# Patient Record
Sex: FEMALE | Race: WHITE | NOT HISPANIC OR LATINO | Employment: FULL TIME | URBAN - METROPOLITAN AREA
[De-identification: names, ages, dates, MRNs, and addresses within clinical notes are randomized per-mention and may not be internally consistent; named-entity substitution may affect disease eponyms.]

---

## 2020-06-22 ENCOUNTER — TELEPHONE (OUTPATIENT)
Dept: ORTHOPEDICS | Facility: CLINIC | Age: 40
End: 2020-06-22

## 2020-06-22 NOTE — TELEPHONE ENCOUNTER
Spoke w/ patient and informed her that I would send a message to the staff to get her scheduled. Patient verbalized understanding and was grateful for the call-DD

## 2020-06-23 ENCOUNTER — TELEPHONE (OUTPATIENT)
Dept: INTERNAL MEDICINE | Facility: CLINIC | Age: 40
End: 2020-06-23

## 2020-06-23 NOTE — TELEPHONE ENCOUNTER
Patient notified and advised that  is no longer taking new patients and will be going to sports medicine full time, no longer primary care. Offered to schedule appointment with another provider, patient declined. Advised to call the office as needed.

## 2020-06-23 NOTE — TELEPHONE ENCOUNTER
----- Message from Kecia Vang MA sent at 6/22/2020  1:25 PM CDT -----  Regarding: FW: EST CARE /PCP  Contact: PATIENT  Good afternoon,         Patient was referred to Dr. Olmedo by a friend and would like to get in asap due to having tingling and numbness in the face. Please Advise.   ----- Message -----  From: Kelsi Gann  Sent: 6/22/2020  12:19 PM CDT  To: [facility] Staff  Subject: EST CARE /PCP                                    CALLING TO GET IN ASAP TO EST AND HAVING NUMBNESS TINGLING IN FACE & FATIGUE. PLEASE CALL PATIENT @ [phone]. THANKS

## 2022-02-06 ENCOUNTER — OFFICE VISIT (OUTPATIENT)
Dept: URGENT CARE | Facility: CLINIC | Age: 42
End: 2022-02-06
Payer: COMMERCIAL

## 2022-02-06 VITALS
SYSTOLIC BLOOD PRESSURE: 101 MMHG | OXYGEN SATURATION: 96 % | HEART RATE: 70 BPM | RESPIRATION RATE: 18 BRPM | TEMPERATURE: 97 F | DIASTOLIC BLOOD PRESSURE: 66 MMHG

## 2022-02-06 DIAGNOSIS — J06.9 URI WITH COUGH AND CONGESTION: Primary | ICD-10-CM

## 2022-02-06 DIAGNOSIS — J04.0 ACUTE LARYNGITIS: ICD-10-CM

## 2022-02-06 LAB
CTP QC/QA: YES
SARS-COV-2 RDRP RESP QL NAA+PROBE: NEGATIVE

## 2022-02-06 PROCEDURE — 99203 PR OFFICE/OUTPT VISIT, NEW, LEVL III, 30-44 MIN: ICD-10-PCS | Mod: 25,S$GLB,, | Performed by: PHYSICIAN ASSISTANT

## 2022-02-06 PROCEDURE — 99203 OFFICE O/P NEW LOW 30 MIN: CPT | Mod: 25,S$GLB,, | Performed by: PHYSICIAN ASSISTANT

## 2022-02-06 PROCEDURE — U0002: ICD-10-PCS | Mod: QW,S$GLB,, | Performed by: PHYSICIAN ASSISTANT

## 2022-02-06 PROCEDURE — 1160F RVW MEDS BY RX/DR IN RCRD: CPT | Mod: CPTII,S$GLB,, | Performed by: PHYSICIAN ASSISTANT

## 2022-02-06 PROCEDURE — 96372 PR INJECTION,THERAP/PROPH/DIAG2ST, IM OR SUBCUT: ICD-10-PCS | Mod: S$GLB,,, | Performed by: EMERGENCY MEDICINE

## 2022-02-06 PROCEDURE — U0002 COVID-19 LAB TEST NON-CDC: HCPCS | Mod: QW,S$GLB,, | Performed by: PHYSICIAN ASSISTANT

## 2022-02-06 PROCEDURE — 3074F PR MOST RECENT SYSTOLIC BLOOD PRESSURE < 130 MM HG: ICD-10-PCS | Mod: CPTII,S$GLB,, | Performed by: PHYSICIAN ASSISTANT

## 2022-02-06 PROCEDURE — 3078F PR MOST RECENT DIASTOLIC BLOOD PRESSURE < 80 MM HG: ICD-10-PCS | Mod: CPTII,S$GLB,, | Performed by: PHYSICIAN ASSISTANT

## 2022-02-06 PROCEDURE — 96372 THER/PROPH/DIAG INJ SC/IM: CPT | Mod: S$GLB,,, | Performed by: EMERGENCY MEDICINE

## 2022-02-06 PROCEDURE — 3074F SYST BP LT 130 MM HG: CPT | Mod: CPTII,S$GLB,, | Performed by: PHYSICIAN ASSISTANT

## 2022-02-06 PROCEDURE — 1160F PR REVIEW ALL MEDS BY PRESCRIBER/CLIN PHARMACIST DOCUMENTED: ICD-10-PCS | Mod: CPTII,S$GLB,, | Performed by: PHYSICIAN ASSISTANT

## 2022-02-06 PROCEDURE — 1159F PR MEDICATION LIST DOCUMENTED IN MEDICAL RECORD: ICD-10-PCS | Mod: CPTII,S$GLB,, | Performed by: PHYSICIAN ASSISTANT

## 2022-02-06 PROCEDURE — 3078F DIAST BP <80 MM HG: CPT | Mod: CPTII,S$GLB,, | Performed by: PHYSICIAN ASSISTANT

## 2022-02-06 PROCEDURE — 1159F MED LIST DOCD IN RCRD: CPT | Mod: CPTII,S$GLB,, | Performed by: PHYSICIAN ASSISTANT

## 2022-02-06 RX ORDER — FLUTICASONE PROPIONATE 50 MCG
2 SPRAY, SUSPENSION (ML) NASAL DAILY
Qty: 9.9 ML | Refills: 0 | Status: SHIPPED | OUTPATIENT
Start: 2022-02-06 | End: 2022-02-13

## 2022-02-06 RX ORDER — DEXAMETHASONE SODIUM PHOSPHATE 100 MG/10ML
8 INJECTION INTRAMUSCULAR; INTRAVENOUS
Status: COMPLETED | OUTPATIENT
Start: 2022-02-06 | End: 2022-02-06

## 2022-02-06 RX ORDER — PROMETHAZINE HYDROCHLORIDE AND DEXTROMETHORPHAN HYDROBROMIDE 6.25; 15 MG/5ML; MG/5ML
5 SYRUP ORAL EVERY 6 HOURS PRN
Qty: 140 ML | Refills: 0 | Status: SHIPPED | OUTPATIENT
Start: 2022-02-06 | End: 2022-02-13

## 2022-02-06 RX ADMIN — DEXAMETHASONE SODIUM PHOSPHATE 8 MG: 100 INJECTION INTRAMUSCULAR; INTRAVENOUS at 09:02

## 2022-02-06 NOTE — PROGRESS NOTES
Subjective:       Patient ID: Shyanne Richardson is a 41 y.o. female.    Vitals:  tympanic temperature is 97.3 °F (36.3 °C). Her blood pressure is 101/66 and her pulse is 70. Her respiration is 18 and oxygen saturation is 96%.     Chief Complaint: Sore Throat    Hoarse voice, HA, body aches, nasal and chest congestion and cough x 3 days. Took at home covid test which was negative at onset. No fever, CP or SOB     Sore Throat   This is a new problem. The current episode started in the past 7 days (02/03/22). The problem has been gradually worsening. Neither side of throat is experiencing more pain than the other. There has been no fever. The pain is at a severity of 10/10. The pain is mild. Associated symptoms include congestion, coughing, headaches, a hoarse voice and swollen glands. Pertinent negatives include no abdominal pain, diarrhea, drooling, ear discharge, ear pain, plugged ear sensation, neck pain, shortness of breath, stridor, trouble swallowing or vomiting. Associated symptoms comments: Nausea, dizziness, body aches. She has had no exposure to strep or mono. Treatments tried: mucinex, aleve. The treatment provided mild relief.       Constitution: Positive for fatigue. Negative for chills, sweating and fever.   HENT: Positive for congestion, sinus pressure and sore throat. Negative for ear pain, ear discharge, drooling and trouble swallowing.    Neck: Negative for neck pain and neck stiffness.   Cardiovascular: Negative for chest pain, leg swelling, palpitations and sob on exertion.   Eyes: Negative for eye discharge, eye itching, eye pain and eye redness.   Respiratory: Positive for cough and sputum production. Negative for shortness of breath and stridor.    Gastrointestinal: Negative for abdominal pain, vomiting and diarrhea.   Genitourinary: Negative for dysuria.   Musculoskeletal: Positive for muscle ache.   Skin: Negative for rash and wound.   Neurological: Positive for headaches.       Objective:       Physical Exam   Constitutional: She is oriented to person, place, and time. She appears well-developed and well-nourished. She is cooperative.  Non-toxic appearance. She does not have a sickly appearance. She does not appear ill. No distress.   HENT:   Head: Normocephalic and atraumatic.   Ears:   Right Ear: Hearing, tympanic membrane, external ear and ear canal normal.   Left Ear: Hearing, tympanic membrane, external ear and ear canal normal.   Nose: Mucosal edema and congestion present. No rhinorrhea or nasal deformity. No epistaxis. Right sinus exhibits no maxillary sinus tenderness and no frontal sinus tenderness. Left sinus exhibits no maxillary sinus tenderness and no frontal sinus tenderness.   Mouth/Throat: Uvula is midline and mucous membranes are normal. No trismus in the jaw. Normal dentition. No uvula swelling. Posterior oropharyngeal erythema and cobblestoning present. No oropharyngeal exudate or posterior oropharyngeal edema.   Eyes: Conjunctivae and lids are normal. No scleral icterus.   Neck: Trachea normal and phonation normal. Neck supple. No edema present. No erythema present. No neck rigidity present.   Cardiovascular: Normal rate, regular rhythm, normal heart sounds, intact distal pulses and normal pulses.   Pulmonary/Chest: Effort normal and breath sounds normal. No respiratory distress. She has no decreased breath sounds. She has no wheezes. She has no rhonchi. She has no rales.   Abdominal: Normal appearance. There is no abdominal tenderness. There is no rebound and no guarding.   Musculoskeletal: Normal range of motion.         General: No deformity or edema. Normal range of motion.   Lymphadenopathy:     She has no cervical adenopathy.   Neurological: She is alert and oriented to person, place, and time. She exhibits normal muscle tone. Coordination normal.   Skin: Skin is warm, dry, intact, not diaphoretic and not pale.   Psychiatric: She has a normal mood and affect. Her speech is  normal and behavior is normal. Judgment and thought content normal. Cognition and memory  Nursing note and vitals reviewed.        Results for orders placed or performed in visit on 02/06/22   POCT COVID-19 Rapid Screening   Result Value Ref Range    POC Rapid COVID Negative Negative     Acceptable Yes        Assessment:       1. URI with cough and congestion    2. Acute laryngitis          Plan:       Pt requested IM steroid. Risks and side effects discussed, as well as option to take po steroids with similar efficacy. Pt elects to receive.     URI with cough and congestion  -     POCT COVID-19 Rapid Screening    Acute laryngitis  -     POCT COVID-19 Rapid Screening    Other orders  -     fluticasone propionate (FLONASE) 50 mcg/actuation nasal spray; 2 sprays (100 mcg total) by Each Nostril route once daily. Dispense 1 bottle for 7 days  Dispense: 9.9 mL; Refill: 0  -     promethazine-dextromethorphan (PROMETHAZINE-DM) 6.25-15 mg/5 mL Syrp; Take 5 mLs by mouth every 6 (six) hours as needed (cough).  Dispense: 140 mL; Refill: 0  -     dexamethasone injection 8 mg    Rebeca Gr PA-C  [facility]         Patient Instructions     Take mucinex DM for daytime use for cough and congestion. You may try the prescribed promethazine DM at night (may cause drowsiness). Alternatively, if your cough becomes more severe you may take promethazine DM cough syrup every 4-6 hours (but do not combine with over the counter cough syrup). Rest and drink plenty of fluids.Tylenol or motrin for fever, sore throat or body aches. Nasal sterile saline sprays and Flonase 1-2 sprays/nostril daily will help with nasal congestion.     You need re-evaluated for any new persistent fever > 4 days, progressive chest pain or shortness of breath, sinus congestion/pressure > 10 days, or cough lasting > 4 weeks.    You were given a steroid injection today. Risks and benefits were discussed with you.   If you have  diabetes this injection will raise your blood sugar. This will normalize over the next 2-3 days. Please make sure you monitor you blood sugar accordingly.   This medication can also increase you blood pressure. Please monitor your blood pressure as discussed.   Please keep in mind that you should not take long term steroids unless prescribed by your physician. You should not exceed 4 steroid injections in a year and if you have multiple injections they should be spaced out adequately. Long term side effects and risks can occur as mentioned at todays visit.     Patient Education       Laryngitis   The Basics   Written by the doctors and editors at [facility]   What is laryngitis? -- Laryngitis is the medical term for when your vocal cords are inflamed. Laryngitis usually causes your voice to sound hoarse. It can even make you lose your voice completely.  What causes laryngitis? -- Laryngitis can be caused by:  · The common cold and other infections that affect the throat  · Shouting or straining your voice too much  · Breathing in harsh chemicals, such as  or gasoline  · Drinking too much alcohol or smoking   · Acid reflux, which is when the acid from your stomach backs up into your throat  There are also medical problems besides laryngitis that can make your voice hoarse or make you lose your voice. For example, people can have these symptoms because of:  · Abnormal growths on the vocal cords  · Muscle disorders affecting the voice box  · Cancer of the throat  Is there anything I can do on my own to get rid of laryngitis? -- Yes. There are different things you can do, depending on what caused your laryngitis.  · If your laryngitis happened because you strained your voice too much, give your voice a rest. If you are a morrison or need to use your voice for work, you might want to think about taking voice lessons. An experienced teacher can help you learn how to protect your voice and prevent straining it  again.  · If your laryngitis was caused by drinking alcohol, limit how much you drink. If it was related to smoking, the best thing you can do is to quit smoking. If you are having trouble quitting, your doctor or nurse can help.  · If your laryngitis was caused by breathing in a harsh chemical, avoid the chemical. If that is not possible, at least make sure there is a lot fresh air coming in when you are dealing with fumes. If you work near chemical fumes that are making you hoarse, speak with your employer about getting masks or ventilation fans.  · If your laryngitis was caused by acid reflux, take steps to avoid acid reflux. For example:  ? Take medicines for acid reflux, if your doctor recommends them.  ? Avoid foods that make your symptoms worse. (Common examples include alcohol, coffee, and chocolate.)  ? If you smoke, stop smoking.  ? Eat many small meals each day, rather than 2 or 3 big meals.  ? Do not lie down for at least 3 hours after eating, particularly after a big meal.  Should I see a doctor or nurse? -- That depends on how long your symptoms last and whether you have symptoms besides hoarseness.  Most people with laryngitis get better on their own within 2 to 3 weeks. If your voice is hoarse or gone for 2 weeks or longer, and you do not seem to be getting better, see a doctor or nurse.  You should also see a doctor or nurse if you also have a sore throat and:  · You have a fever of at least 101°F or 38.4°C  · Your throat pain is severe or does not start to improve within 5 to 7 days  Will I need tests? -- Maybe. If your doctor or nurse is not sure what is causing your symptoms, you might need tests. For example, you might have a laryngoscopy, which is when the doctor puts a tube with a tiny camera in your throat to look at your voice box.  How is laryngitis treated? -- That depends on what is causing it. If your laryngitis is caused by a cold or other minor infection, you will probably not need any  treatment. If something else is causing your laryngitis, you might need treatment, depending on the situation.  What if my child gets laryngitis? -- Some of the same things that cause laryngitis in adults can cause it children, too. For instance, children can get laryngitis because of a throat infection or common cold, because of acid reflux, or because they strain their voice too much. But in children, sounding hoarse can have lots of other causes. For example, children sometimes develop bumps on their vocal cords or have birth defects affecting their voice box.  See a doctor or nurse right away if your child has trouble breathing or has pain or other symptoms that seem to be quickly getting worse. You should also see a doctor or nurse if your child has laryngitis for more than 2 weeks, or if the laryngitis is getting worse or is making it hard for your child to interact with others. If your child has laryngitis or throat discomfort or pain that gets better and comes back, see a doctor or nurse. If your child is a baby, call the baby's doctor as soon as you notice symptoms. The doctor will tell you what to do for your baby.  All topics are updated as new evidence becomes available and our peer review process is complete.  This topic retrieved from [employer] on: Sep 21, 2021.  Topic 96179 Version 14.0  Release: 29.4.2 - C29.263  © 2021 UpToDate, Inc. and/or its affiliates. All rights reserved.  Consumer Information Use and Disclaimer   This information is not specific medical advice and does not replace information you receive from your health care provider. This is only a brief summary of general information. It does NOT include all information about conditions, illnesses, injuries, tests, procedures, treatments, therapies, discharge instructions or life-style choices that may apply to you. You must talk with your health care provider for complete information about your health and treatment options. This information  should not be used to decide whether or not to accept your health care provider's advice, instructions or recommendations. Only your health care provider has the knowledge and training to provide advice that is right for you. The use of this information is governed by the [employer] End User License Agreement, available at [email or web address]://[email or web address]/en/solutions/[employer]/about/kira.The use of [employer] content is governed by the [employer] Terms of Use. ©2021 UpToDate, Inc. All rights reserved.  Copyright   © 2021 UpToDate, Inc. and/or its affiliates. All rights reserved.  Patient Education       Cough, Runny Nose, and the Common Cold   The Basics   Written by the doctors and editors at "[employer]   What causes cough, runny nose, and other symptoms of the common cold? -- These symptoms are usually caused by a viral infection. Lots of different viruses can take hold inside your nose, mouth, throat, or airways and cause cold symptoms.  Most people get over a cold without any lasting problems. Even so, having a cold can be uncomfortable. Also, some cold symptoms can also be caused by other illnesses, such as coronavirus 2019 (COVID-19) or the flu.  What are the symptoms of the common cold? -- The symptoms include:  · Sneezing  · Coughing  · Sniffling and runny nose  · Sore throat  · Chest congestion  In children, the common cold can also cause a fever. But adults do not usually get a fever when they have a cold. Some symptoms of the common cold can overlap with symptoms of COVID-19, although sneezing is uncommon in COVID-19.   When should I call the doctor or nurse? -- Contact your doctor or nurse if you live in an area where people have COVID-19. They will ask you questions about your symptoms and whether you might be at risk. They can tell you if you should get tested for the virus that causes COVID-19. If they think you are more likely to just have a cold, they might tell you to stay home and contact them again if your  symptoms change or get worse.   You should also contact your doctor or nurse if you:  · Lose your sense of taste or smell  · Have a fever of more than 100.4º F (38º C) that comes with shaking chills, loss of appetite, or trouble breathing  · Have a fever and also have lung disease, such as emphysema or asthma  · Have a cough that lasts longer than 10 days  · Have chest pain when you cough or breathe deeply, have trouble breathing, or cough up blood  If you are older than 65, or if you have any chronic medical conditions such as diabetes, you should contact your doctor or nurse any time you get a long-lasting cough.  Take your child to the emergency room if they:  · Become confused or stop responding to you  · Have trouble breathing or have to work hard to breathe  Contact your child's doctor or nurse if the child:  · Refuses to drink anything for a long time  · Is younger than 4 months  · Has a fever and is not acting like themself  · Has a cough that lasts for more than 2 weeks and is not getting any better  · Has a stuffed or runny nose that gets worse or does not get any better after 10 days  · Has red eyes or yellow goop coming out of their eyes  · Has ear pain, pulls at their ears, or shows other signs of having an ear infection  What can I do to feel better? -- If you are a teenager or an adult, you can try cough and cold medicines that you can get without a prescription. These medicines might help with your symptoms. But they won't cure your cold, or help you get well faster.  If you decide to try nonprescription cold medicines, be sure to follow the directions on the label. Do not combine 2 or more medicines that have acetaminophen in them. If you take too much acetaminophen, the drug can damage your liver. Also, if you have a heart condition, high blood pressure, or you take any prescription medicines, ask your pharmacist if it is safe to take the cold medicine you have in mind.  What should I know if my  child has a cold? -- In children, the common cold is often more severe than it is in adults. It also lasts longer. Plus, children often get a fever during the first 3 days of a cold.  Are cough and cold medicines safe for children? -- If your child is younger than 6, you should not give them any cold medicines. These medicines are not safe for young children. Even if your child is older than 6, cough and cold medicines are unlikely to help.  Never give aspirin to any child younger than 18 years old. In children, aspirin can cause a life-threatening condition called Reye syndrome. When giving your child acetaminophen or other nonprescription medicines, never give more than the recommended dose.  How long will I be sick? -- Colds usually last 3 to 7 days in adults and 10 days in children, but some people have symptoms for up to 2 weeks.  Can the common cold lead to more serious problems? -- In some cases, yes. In some people having a cold can lead to:  · Ear infections  · Worsening of asthma symptoms  · Sinus infections  · Pneumonia or bronchitis (infections of the lungs)  How can I keep from getting another cold? -- The most important thing you can do is to wash your hands often with soap and water. This can also prevent the spread of other illnesses like the flu and COVID-19. The table has instructions on how to wash your hands to prevent spreading illness (table 1).  The germs that cause the common cold can live on tables, door handles, and other surfaces for at least 2 hours. You never know when you might be touching germs. That's why it's so important to clean your hands often.  It's also important to stay away from other people when you are sick. This will help prevent the spread of illness.  All topics are updated as new evidence becomes available and our peer review process is complete.  This topic retrieved from [employer] on: Sep 21, 2021.  Topic 96823 Version 20.0  Release: 29.4.2 - C29.263  © 2021 [employer]  Inc. and/or its affiliates. All rights reserved.  table 1: Hand washing to prevent spreading illness  · Wet your hands and put soap on them    · Rub your hands together for at least 20 seconds. Make sure to clean your wrists, fingernails, and in between your fingers.    · Rinse your hands    · Dry your hands with a paper towel that you can throw away    If you are not near a sink, you can use a hand gel to clean your hands. The gels with at least 60 percent alcohol work the best. But it is better to wash with soap and water if you can.  Graphic 080621 Version 3.0  Consumer Information Use and Disclaimer   This information is not specific medical advice and does not replace information you receive from your health care provider. This is only a brief summary of general information. It does NOT include all information about conditions, illnesses, injuries, tests, procedures, treatments, therapies, discharge instructions or life-style choices that may apply to you. You must talk with your health care provider for complete information about your health and treatment options. This information should not be used to decide whether or not to accept your health care provider's advice, instructions or recommendations. Only your health care provider has the knowledge and training to provide advice that is right for you. The use of this information is governed by the [employer] End User License Agreement, available at https://www.[employer]Tapad/en/solutions/[employer]/about/kira.The use of [employer] content is governed by the [employer] Terms of Use. ©2021 UpToDate, Inc. All rights reserved.  Copyright   © 2021 UpToDate, Inc. and/or its affiliates. All rights reserved.

## 2022-02-06 NOTE — PATIENT INSTRUCTIONS
Take mucinex DM for daytime use for cough and congestion. You may try the prescribed promethazine DM at night (may cause drowsiness). Alternatively, if your cough becomes more severe you may take promethazine DM cough syrup every 4-6 hours (but do not combine with over the counter cough syrup). Rest and drink plenty of fluids.Tylenol or motrin for fever, sore throat or body aches. Nasal sterile saline sprays and Flonase 1-2 sprays/nostril daily will help with nasal congestion.     You need re-evaluated for any new persistent fever > 4 days, progressive chest pain or shortness of breath, sinus congestion/pressure > 10 days, or cough lasting > 4 weeks.    You were given a steroid injection today. Risks and benefits were discussed with you.   If you have diabetes this injection will raise your blood sugar. This will normalize over the next 2-3 days. Please make sure you monitor you blood sugar accordingly.   This medication can also increase you blood pressure. Please monitor your blood pressure as discussed.   Please keep in mind that you should not take long term steroids unless prescribed by your physician. You should not exceed 4 steroid injections in a year and if you have multiple injections they should be spaced out adequately. Long term side effects and risks can occur as mentioned at todays visit.     Patient Education       Laryngitis   The Basics   Written by the doctors and editors at [facility]   What is laryngitis? -- Laryngitis is the medical term for when your vocal cords are inflamed. Laryngitis usually causes your voice to sound hoarse. It can even make you lose your voice completely.  What causes laryngitis? -- Laryngitis can be caused by:  · The common cold and other infections that affect the throat  · Shouting or straining your voice too much  · Breathing in harsh chemicals, such as  or gasoline  · Drinking too much alcohol or smoking   · Acid reflux, which is when the acid from your stomach  backs up into your throat  There are also medical problems besides laryngitis that can make your voice hoarse or make you lose your voice. For example, people can have these symptoms because of:  · Abnormal growths on the vocal cords  · Muscle disorders affecting the voice box  · Cancer of the throat  Is there anything I can do on my own to get rid of laryngitis? -- Yes. There are different things you can do, depending on what caused your laryngitis.  · If your laryngitis happened because you strained your voice too much, give your voice a rest. If you are a morrison or need to use your voice for work, you might want to think about taking voice lessons. An experienced teacher can help you learn how to protect your voice and prevent straining it again.  · If your laryngitis was caused by drinking alcohol, limit how much you drink. If it was related to smoking, the best thing you can do is to quit smoking. If you are having trouble quitting, your doctor or nurse can help.  · If your laryngitis was caused by breathing in a harsh chemical, avoid the chemical. If that is not possible, at least make sure there is a lot fresh air coming in when you are dealing with fumes. If you work near chemical fumes that are making you hoarse, speak with your employer about getting masks or ventilation fans.  · If your laryngitis was caused by acid reflux, take steps to avoid acid reflux. For example:  ? Take medicines for acid reflux, if your doctor recommends them.  ? Avoid foods that make your symptoms worse. (Common examples include alcohol, coffee, and chocolate.)  ? If you smoke, stop smoking.  ? Eat many small meals each day, rather than 2 or 3 big meals.  ? Do not lie down for at least 3 hours after eating, particularly after a big meal.  Should I see a doctor or nurse? -- That depends on how long your symptoms last and whether you have symptoms besides hoarseness.  Most people with laryngitis get better on their own within 2 to  3 weeks. If your voice is hoarse or gone for 2 weeks or longer, and you do not seem to be getting better, see a doctor or nurse.  You should also see a doctor or nurse if you also have a sore throat and:  · You have a fever of at least 101°F or 38.4°C  · Your throat pain is severe or does not start to improve within 5 to 7 days  Will I need tests? -- Maybe. If your doctor or nurse is not sure what is causing your symptoms, you might need tests. For example, you might have a laryngoscopy, which is when the doctor puts a tube with a tiny camera in your throat to look at your voice box.  How is laryngitis treated? -- That depends on what is causing it. If your laryngitis is caused by a cold or other minor infection, you will probably not need any treatment. If something else is causing your laryngitis, you might need treatment, depending on the situation.  What if my child gets laryngitis? -- Some of the same things that cause laryngitis in adults can cause it children, too. For instance, children can get laryngitis because of a throat infection or common cold, because of acid reflux, or because they strain their voice too much. But in children, sounding hoarse can have lots of other causes. For example, children sometimes develop bumps on their vocal cords or have birth defects affecting their voice box.  See a doctor or nurse right away if your child has trouble breathing or has pain or other symptoms that seem to be quickly getting worse. You should also see a doctor or nurse if your child has laryngitis for more than 2 weeks, or if the laryngitis is getting worse or is making it hard for your child to interact with others. If your child has laryngitis or throat discomfort or pain that gets better and comes back, see a doctor or nurse. If your child is a baby, call the baby's doctor as soon as you notice symptoms. The doctor will tell you what to do for your baby.  All topics are updated as new evidence becomes  available and our peer review process is complete.  This topic retrieved from [employer] on: Sep 21, 2021.  Topic 31052 Version 14.0  Release: 29.4.2 - C29.263  © 2021 UpToDate, Inc. and/or its affiliates. All rights reserved.  Consumer Information Use and Disclaimer   This information is not specific medical advice and does not replace information you receive from your health care provider. This is only a brief summary of general information. It does NOT include all information about conditions, illnesses, injuries, tests, procedures, treatments, therapies, discharge instructions or life-style choices that may apply to you. You must talk with your health care provider for complete information about your health and treatment options. This information should not be used to decide whether or not to accept your health care provider's advice, instructions or recommendations. Only your health care provider has the knowledge and training to provide advice that is right for you. The use of this information is governed by the [employer] End User License Agreement, available at https://www.Zaz[employer]/en/solutions/[employer]/about/kira.The use of [employer] content is governed by the [employer] Terms of Use. ©2021 UpToDate, Inc. All rights reserved.  Copyright   © 2021 UpToDate, Inc. and/or its affiliates. All rights reserved.  Patient Education       Cough, Runny Nose, and the Common Cold   The Basics   Written by the doctors and editors at [employer]   What causes cough, runny nose, and other symptoms of the common cold? -- These symptoms are usually caused by a viral infection. Lots of different viruses can take hold inside your nose, mouth, throat, or airways and cause cold symptoms.  Most people get over a cold without any lasting problems. Even so, having a cold can be uncomfortable. Also, some cold symptoms can also be caused by other illnesses, such as coronavirus 2019 (COVID-19) or the flu.  What are the symptoms of the  common cold? -- The symptoms include:  · Sneezing  · Coughing  · Sniffling and runny nose  · Sore throat  · Chest congestion  In children, the common cold can also cause a fever. But adults do not usually get a fever when they have a cold. Some symptoms of the common cold can overlap with symptoms of COVID-19, although sneezing is uncommon in COVID-19.   When should I call the doctor or nurse? -- Contact your doctor or nurse if you live in an area where people have COVID-19. They will ask you questions about your symptoms and whether you might be at risk. They can tell you if you should get tested for the virus that causes COVID-19. If they think you are more likely to just have a cold, they might tell you to stay home and contact them again if your symptoms change or get worse.   You should also contact your doctor or nurse if you:  · Lose your sense of taste or smell  · Have a fever of more than 100.4º F (38º C) that comes with shaking chills, loss of appetite, or trouble breathing  · Have a fever and also have lung disease, such as emphysema or asthma  · Have a cough that lasts longer than 10 days  · Have chest pain when you cough or breathe deeply, have trouble breathing, or cough up blood  If you are older than 65, or if you have any chronic medical conditions such as diabetes, you should contact your doctor or nurse any time you get a long-lasting cough.  Take your child to the emergency room if they:  · Become confused or stop responding to you  · Have trouble breathing or have to work hard to breathe  Contact your child's doctor or nurse if the child:  · Refuses to drink anything for a long time  · Is younger than 4 months  · Has a fever and is not acting like themself  · Has a cough that lasts for more than 2 weeks and is not getting any better  · Has a stuffed or runny nose that gets worse or does not get any better after 10 days  · Has red eyes or yellow goop coming out of their eyes  · Has ear pain,  pulls at their ears, or shows other signs of having an ear infection  What can I do to feel better? -- If you are a teenager or an adult, you can try cough and cold medicines that you can get without a prescription. These medicines might help with your symptoms. But they won't cure your cold, or help you get well faster.  If you decide to try nonprescription cold medicines, be sure to follow the directions on the label. Do not combine 2 or more medicines that have acetaminophen in them. If you take too much acetaminophen, the drug can damage your liver. Also, if you have a heart condition, high blood pressure, or you take any prescription medicines, ask your pharmacist if it is safe to take the cold medicine you have in mind.  What should I know if my child has a cold? -- In children, the common cold is often more severe than it is in adults. It also lasts longer. Plus, children often get a fever during the first 3 days of a cold.  Are cough and cold medicines safe for children? -- If your child is younger than 6, you should not give them any cold medicines. These medicines are not safe for young children. Even if your child is older than 6, cough and cold medicines are unlikely to help.  Never give aspirin to any child younger than 18 years old. In children, aspirin can cause a life-threatening condition called Reye syndrome. When giving your child acetaminophen or other nonprescription medicines, never give more than the recommended dose.  How long will I be sick? -- Colds usually last 3 to 7 days in adults and 10 days in children, but some people have symptoms for up to 2 weeks.  Can the common cold lead to more serious problems? -- In some cases, yes. In some people having a cold can lead to:  · Ear infections  · Worsening of asthma symptoms  · Sinus infections  · Pneumonia or bronchitis (infections of the lungs)  How can I keep from getting another cold? -- The most important thing you can do is to wash your  hands often with soap and water. This can also prevent the spread of other illnesses like the flu and COVID-19. The table has instructions on how to wash your hands to prevent spreading illness (table 1).  The germs that cause the common cold can live on tables, door handles, and other surfaces for at least 2 hours. You never know when you might be touching germs. That's why it's so important to clean your hands often.  It's also important to stay away from other people when you are sick. This will help prevent the spread of illness.  All topics are updated as new evidence becomes available and our peer review process is complete.  This topic retrieved from [employer] on: Sep 21, 2021.  Topic 49978 Version 20.0  Release: 29.4.2 - C29.263  © 2021 UpToDate, Inc. and/or its affiliates. All rights reserved.  table 1: Hand washing to prevent spreading illness  · Wet your hands and put soap on them    · Rub your hands together for at least 20 seconds. Make sure to clean your wrists, fingernails, and in between your fingers.    · Rinse your hands    · Dry your hands with a paper towel that you can throw away    If you are not near a sink, you can use a hand gel to clean your hands. The gels with at least 60 percent alcohol work the best. But it is better to wash with soap and water if you can.  Graphic 989426 Version 3.0  Consumer Information Use and Disclaimer   This information is not specific medical advice and does not replace information you receive from your health care provider. This is only a brief summary of general information. It does NOT include all information about conditions, illnesses, injuries, tests, procedures, treatments, therapies, discharge instructions or life-style choices that may apply to you. You must talk with your health care provider for complete information about your health and treatment options. This information should not be used to decide whether or not to accept your health care provider's  advice, instructions or recommendations. Only your health care provider has the knowledge and training to provide advice that is right for you. The use of this information is governed by the [employer] End User License Agreement, available at https://www.[employer]/en/solutions/[employer]/about/kira.The use of [employer] content is governed by the [employer] Terms of Use. ©2021 UpToDate, Inc. All rights reserved.  Copyright   © 2021 UpToDate, Inc. and/or its affiliates. All rights reserved.

## 2022-02-09 ENCOUNTER — OFFICE VISIT (OUTPATIENT)
Dept: URGENT CARE | Facility: CLINIC | Age: 42
End: 2022-02-09
Payer: COMMERCIAL

## 2022-02-09 VITALS
DIASTOLIC BLOOD PRESSURE: 67 MMHG | RESPIRATION RATE: 18 BRPM | TEMPERATURE: 100 F | SYSTOLIC BLOOD PRESSURE: 106 MMHG | OXYGEN SATURATION: 100 % | HEIGHT: 61 IN | WEIGHT: 145 LBS | HEART RATE: 88 BPM | BODY MASS INDEX: 27.38 KG/M2

## 2022-02-09 DIAGNOSIS — R50.9 FEVER, UNSPECIFIED FEVER CAUSE: ICD-10-CM

## 2022-02-09 DIAGNOSIS — J01.00 ACUTE NON-RECURRENT MAXILLARY SINUSITIS: Primary | ICD-10-CM

## 2022-02-09 LAB
CTP QC/QA: YES
POC MOLECULAR INFLUENZA A AGN: NEGATIVE
POC MOLECULAR INFLUENZA B AGN: NEGATIVE

## 2022-02-09 PROCEDURE — 87502 POCT INFLUENZA A/B MOLECULAR: ICD-10-PCS | Mod: QW,S$GLB,, | Performed by: PHYSICIAN ASSISTANT

## 2022-02-09 PROCEDURE — 99213 PR OFFICE/OUTPT VISIT, EST, LEVL III, 20-29 MIN: ICD-10-PCS | Mod: S$GLB,,, | Performed by: PHYSICIAN ASSISTANT

## 2022-02-09 PROCEDURE — 3008F PR BODY MASS INDEX (BMI) DOCUMENTED: ICD-10-PCS | Mod: CPTII,S$GLB,, | Performed by: PHYSICIAN ASSISTANT

## 2022-02-09 PROCEDURE — 3008F BODY MASS INDEX DOCD: CPT | Mod: CPTII,S$GLB,, | Performed by: PHYSICIAN ASSISTANT

## 2022-02-09 PROCEDURE — 1160F RVW MEDS BY RX/DR IN RCRD: CPT | Mod: CPTII,S$GLB,, | Performed by: PHYSICIAN ASSISTANT

## 2022-02-09 PROCEDURE — 87502 INFLUENZA DNA AMP PROBE: CPT | Mod: QW,S$GLB,, | Performed by: PHYSICIAN ASSISTANT

## 2022-02-09 PROCEDURE — 3078F DIAST BP <80 MM HG: CPT | Mod: CPTII,S$GLB,, | Performed by: PHYSICIAN ASSISTANT

## 2022-02-09 PROCEDURE — 3074F PR MOST RECENT SYSTOLIC BLOOD PRESSURE < 130 MM HG: ICD-10-PCS | Mod: CPTII,S$GLB,, | Performed by: PHYSICIAN ASSISTANT

## 2022-02-09 PROCEDURE — 1159F MED LIST DOCD IN RCRD: CPT | Mod: CPTII,S$GLB,, | Performed by: PHYSICIAN ASSISTANT

## 2022-02-09 PROCEDURE — 1159F PR MEDICATION LIST DOCUMENTED IN MEDICAL RECORD: ICD-10-PCS | Mod: CPTII,S$GLB,, | Performed by: PHYSICIAN ASSISTANT

## 2022-02-09 PROCEDURE — 99213 OFFICE O/P EST LOW 20 MIN: CPT | Mod: S$GLB,,, | Performed by: PHYSICIAN ASSISTANT

## 2022-02-09 PROCEDURE — 3078F PR MOST RECENT DIASTOLIC BLOOD PRESSURE < 80 MM HG: ICD-10-PCS | Mod: CPTII,S$GLB,, | Performed by: PHYSICIAN ASSISTANT

## 2022-02-09 PROCEDURE — 1160F PR REVIEW ALL MEDS BY PRESCRIBER/CLIN PHARMACIST DOCUMENTED: ICD-10-PCS | Mod: CPTII,S$GLB,, | Performed by: PHYSICIAN ASSISTANT

## 2022-02-09 PROCEDURE — 3074F SYST BP LT 130 MM HG: CPT | Mod: CPTII,S$GLB,, | Performed by: PHYSICIAN ASSISTANT

## 2022-02-09 RX ORDER — DOXYCYCLINE HYCLATE 100 MG
100 TABLET ORAL 2 TIMES DAILY
Qty: 14 TABLET | Refills: 0 | Status: SHIPPED | OUTPATIENT
Start: 2022-02-09 | End: 2022-02-16

## 2022-02-09 NOTE — PROGRESS NOTES
"Subjective:       Patient ID: Shyanne Richardson is a 41 y.o. female.    Vitals:  height is 5' 1" (1.549 m) and weight is 65.8 kg (145 lb). Her temperature is 99.9 °F (37.7 °C). Her blood pressure is 106/67 and her pulse is 88. Her respiration is 18 and oxygen saturation is 100%.     Chief Complaint: Cough (Sinus pressure, nausea, Congestion and sore throat/ ) and Sinus Problem (PT PRESENTS TO U/C WITH S/T, EAR PAIN,CHEST CONGESTION,H/A SINCE Friday.)    PT PRESENTS TO U/C WITH S/T, EAR PAIN,CHEST CONGESTION,H/A SINCE Friday.    Cough  This is a new problem. The current episode started in the past 7 days. The problem has been unchanged. The problem occurs constantly. The cough is productive of sputum. Associated symptoms include ear congestion, nasal congestion and postnasal drip. Nothing aggravates the symptoms. She has tried OTC cough suppressant for the symptoms. The treatment provided no relief.       HENT: Positive for postnasal drip.        Objective:      Physical Exam   HENT:   Head: Normocephalic and atraumatic.   Ears:   Right Ear: impacted cerumen  Left Ear: Tympanic membrane and ear canal normal.   Nose: Rhinorrhea and congestion present. Right sinus exhibits maxillary sinus tenderness. Left sinus exhibits maxillary sinus tenderness.   Mouth/Throat: Uvula is midline. Posterior oropharyngeal erythema present. Tonsils are 0 on the right. Tonsils are 0 on the left. No tonsillar exudate.   Eyes: Conjunctivae are normal. Pupils are equal, round, and reactive to light.   Neck: No neck rigidity present.   Cardiovascular: Normal rate and normal pulses.   Pulmonary/Chest: Effort normal. No stridor. No respiratory distress. She has no wheezes. She has no rhonchi.   Abdominal: Normal appearance and bowel sounds are normal.   Lymphadenopathy:     She has cervical adenopathy.   Neurological: She is alert.   Nursing note and vitals reviewed.        Assessment:       1. Acute non-recurrent maxillary sinusitis    2. " Fever, unspecified fever cause        Here with 6 days of fatigue, body aches, fever, congestion, sore throat and headache. She was seen here 3 days ago and tested for covid. She also tested herself at home yesterday for covid and it continued to be negative. She has congestion and maxillary sinus cavity tenderness. I will test her for influenza which was negative. She is suffering from acute maxillary sinusitis. She will be started on doxycycline. She was also advised to try afrin for congestion. She was instructed to hydrate and return to the clinic if new or worsening symptoms occur.    Plan:         Acute non-recurrent maxillary sinusitis  -     doxycycline (VIBRA-TABS) 100 MG tablet; Take 1 tablet (100 mg total) by mouth 2 (two) times daily. for 7 days  Dispense: 14 tablet; Refill: 0    Fever, unspecified fever cause  -     POCT Influenza A/B MOLECULAR

## 2022-02-12 ENCOUNTER — TELEPHONE (OUTPATIENT)
Dept: URGENT CARE | Facility: CLINIC | Age: 42
End: 2022-02-12
Payer: COMMERCIAL

## 2024-08-12 ENCOUNTER — HOSPITAL ENCOUNTER (OUTPATIENT)
Dept: PREADMISSION TESTING | Facility: HOSPITAL | Age: 44
Discharge: HOME | End: 2024-08-12
Attending: INTERNAL MEDICINE
Payer: COMMERCIAL

## 2024-08-12 DIAGNOSIS — Z12.11 SCREENING FOR COLON CANCER: ICD-10-CM

## 2024-08-12 RX ORDER — SODIUM, POTASSIUM,MAG SULFATES 17.5-3.13G
1 SOLUTION, RECONSTITUTED, ORAL ORAL DAILY
Qty: 1 KIT | Refills: 0 | Status: SHIPPED | OUTPATIENT
Start: 2024-08-12 | End: 2024-08-14